# Patient Record
Sex: FEMALE | Race: WHITE | NOT HISPANIC OR LATINO | Employment: PART TIME | URBAN - METROPOLITAN AREA
[De-identification: names, ages, dates, MRNs, and addresses within clinical notes are randomized per-mention and may not be internally consistent; named-entity substitution may affect disease eponyms.]

---
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
## 2021-12-23 ENCOUNTER — OFFICE VISIT (OUTPATIENT)
Dept: URGENT CARE | Facility: PHYSICIAN GROUP | Age: 21
End: 2021-12-23
Payer: COMMERCIAL

## 2021-12-23 VITALS
HEART RATE: 72 BPM | SYSTOLIC BLOOD PRESSURE: 100 MMHG | BODY MASS INDEX: 22.82 KG/M2 | RESPIRATION RATE: 16 BRPM | WEIGHT: 142 LBS | HEIGHT: 66 IN | OXYGEN SATURATION: 98 % | DIASTOLIC BLOOD PRESSURE: 62 MMHG | TEMPERATURE: 98.5 F

## 2021-12-23 DIAGNOSIS — J02.9 PHARYNGITIS, UNSPECIFIED ETIOLOGY: ICD-10-CM

## 2021-12-23 LAB
INT CON NEG: NEGATIVE
INT CON POS: POSITIVE
S PYO AG THROAT QL: NEGATIVE

## 2021-12-23 PROCEDURE — 99213 OFFICE O/P EST LOW 20 MIN: CPT | Performed by: PHYSICIAN ASSISTANT

## 2021-12-23 PROCEDURE — 87880 STREP A ASSAY W/OPTIC: CPT | Performed by: PHYSICIAN ASSISTANT

## 2021-12-23 ASSESSMENT — ENCOUNTER SYMPTOMS
DIARRHEA: 0
PALPITATIONS: 0
COUGH: 0
SPUTUM PRODUCTION: 0
MYALGIAS: 0
WHEEZING: 0
NECK PAIN: 0
DIZZINESS: 0
DIAPHORESIS: 0
FEVER: 0
SHORTNESS OF BREATH: 0
NAUSEA: 0
HEADACHES: 0
VOMITING: 0
CHILLS: 0
ABDOMINAL PAIN: 0
SORE THROAT: 1
SINUS PAIN: 0

## 2021-12-23 NOTE — PROGRESS NOTES
"Subjective:   Bisi Mitchell is a 21 y.o. female who presents for Pharyngitis (Started yesterday )      HPI:  This is a very pleasant 21-year-old female presenting to the clinic with a sore throat that started yesterday.  Patient states that is minimally painful every time she swallows.  No difficulty swallowing or handling secretions.  She has not been running a fever.  Describes mild sinus congestion and clear rhinorrhea.  Denies any cough, shortness of breath or chest pain.  No body aches or chills.  Currently not taking any OTC medications for her symptoms.  No known ill contacts.    Review of Systems   Constitutional: Negative for chills, diaphoresis, fever and malaise/fatigue.   HENT: Positive for congestion and sore throat. Negative for ear pain and sinus pain.    Respiratory: Negative for cough, sputum production, shortness of breath and wheezing.    Cardiovascular: Negative for chest pain and palpitations.   Gastrointestinal: Negative for abdominal pain, diarrhea, nausea and vomiting.   Musculoskeletal: Negative for myalgias and neck pain.   Neurological: Negative for dizziness and headaches.   Endo/Heme/Allergies: Negative for environmental allergies.       Medications:    • Naida Tabs    Allergies: Patient has no known allergies.    Problem List: Bisi Mitchell does not have a problem list on file.    Surgical History:  No past surgical history on file.    Past Social Hx: Bisi Mitchell  reports that she has never smoked. She has never used smokeless tobacco. She reports that she does not drink alcohol and does not use drugs.     Past Family Hx:  Bisi Mitchell family history is not on file.     Problem list, medications, and allergies reviewed by myself today in Epic.     Objective:     /62   Pulse 72   Temp 36.9 °C (98.5 °F)   Resp 16   Ht 1.676 m (5' 6\")   Wt 64.4 kg (142 lb)   SpO2 98%   BMI 22.92 kg/m²     Physical Exam  Constitutional:       General: She is not in acute " distress.     Appearance: Normal appearance. She is not ill-appearing, toxic-appearing or diaphoretic.   HENT:      Head: Normocephalic and atraumatic.      Right Ear: Tympanic membrane, ear canal and external ear normal.      Left Ear: Tympanic membrane, ear canal and external ear normal.      Nose: Congestion and rhinorrhea present.      Mouth/Throat:      Mouth: Mucous membranes are moist.      Pharynx: No oropharyngeal exudate or posterior oropharyngeal erythema.   Eyes:      Conjunctiva/sclera: Conjunctivae normal.   Cardiovascular:      Rate and Rhythm: Normal rate and regular rhythm.      Pulses: Normal pulses.      Heart sounds: Normal heart sounds.   Pulmonary:      Effort: Pulmonary effort is normal.      Breath sounds: Normal breath sounds. No wheezing.   Musculoskeletal:      Cervical back: Normal range of motion. No rigidity. No muscular tenderness.   Lymphadenopathy:      Cervical: No cervical adenopathy.   Skin:     General: Skin is warm and dry.      Capillary Refill: Capillary refill takes less than 2 seconds.   Neurological:      Mental Status: She is alert.   Psychiatric:         Mood and Affect: Mood normal.         Thought Content: Thought content normal.       POCT Strep: Negative    Assessment/Plan:     Comments/MDM:     • Rapid strep in clinic was negative.  • Discussed likely viral etiology of the patient's symptoms.  • Increase fluid intake.  • Alternate Tylenol and ibuprofen as needed for pain.  • Salt water gargles and Cepacol spray if needed.  • Follow-up in clinic for any worsening symptoms.  Call with any questions or concerns.     Diagnosis and associated orders:     1. Pharyngitis, unspecified etiology  POCT Rapid Strep A            Differential diagnosis, natural history, supportive care, and indications for immediate follow-up discussed.    I personally reviewed prior external notes and test results pertinent to today's visit.     Advised the patient to follow-up with the primary  care physician for recheck, reevaluation, and consideration of further management.    Please note that this dictation was created using voice recognition software. I have made reasonable attempt to correct obvious errors, but I expect that there are errors of grammar and possibly content that I did not discover before finalizing the note.    This note was electronically signed by ATUL Barrera PA-C

## 2022-01-20 ENCOUNTER — OFFICE VISIT (OUTPATIENT)
Dept: URGENT CARE | Facility: PHYSICIAN GROUP | Age: 22
End: 2022-01-20
Payer: COMMERCIAL

## 2022-01-20 ENCOUNTER — HOSPITAL ENCOUNTER (OUTPATIENT)
Facility: MEDICAL CENTER | Age: 22
End: 2022-01-20
Attending: NURSE PRACTITIONER
Payer: COMMERCIAL

## 2022-01-20 VITALS
SYSTOLIC BLOOD PRESSURE: 100 MMHG | TEMPERATURE: 97.5 F | DIASTOLIC BLOOD PRESSURE: 60 MMHG | BODY MASS INDEX: 22.29 KG/M2 | HEIGHT: 67 IN | RESPIRATION RATE: 16 BRPM | WEIGHT: 142 LBS | OXYGEN SATURATION: 97 % | HEART RATE: 77 BPM

## 2022-01-20 DIAGNOSIS — R53.83 OTHER FATIGUE: ICD-10-CM

## 2022-01-20 DIAGNOSIS — J02.9 SORE THROAT: ICD-10-CM

## 2022-01-20 DIAGNOSIS — R05.9 COUGH: ICD-10-CM

## 2022-01-20 DIAGNOSIS — M79.10 MYALGIA: ICD-10-CM

## 2022-01-20 LAB
HETEROPH AB SER QL LA: NEGATIVE
INT CON NEG: NEGATIVE
INT CON NEG: NORMAL
INT CON POS: NORMAL
INT CON POS: POSITIVE
S PYO AG THROAT QL: NEGATIVE

## 2022-01-20 PROCEDURE — 86308 HETEROPHILE ANTIBODY SCREEN: CPT | Performed by: NURSE PRACTITIONER

## 2022-01-20 PROCEDURE — U0003 INFECTIOUS AGENT DETECTION BY NUCLEIC ACID (DNA OR RNA); SEVERE ACUTE RESPIRATORY SYNDROME CORONAVIRUS 2 (SARS-COV-2) (CORONAVIRUS DISEASE [COVID-19]), AMPLIFIED PROBE TECHNIQUE, MAKING USE OF HIGH THROUGHPUT TECHNOLOGIES AS DESCRIBED BY CMS-2020-01-R: HCPCS

## 2022-01-20 PROCEDURE — 87880 STREP A ASSAY W/OPTIC: CPT | Performed by: NURSE PRACTITIONER

## 2022-01-20 PROCEDURE — 99213 OFFICE O/P EST LOW 20 MIN: CPT | Performed by: NURSE PRACTITIONER

## 2022-01-20 PROCEDURE — U0005 INFEC AGEN DETEC AMPLI PROBE: HCPCS

## 2022-01-20 RX ORDER — NORETHINDRONE ACETATE AND ETHINYL ESTRADIOL, ETHINYL ESTRADIOL AND FERROUS FUMARATE 1MG-10(24)
KIT ORAL
COMMUNITY
Start: 2021-12-16

## 2022-01-20 ASSESSMENT — ENCOUNTER SYMPTOMS
MUSCULOSKELETAL NEGATIVE: 1
PSYCHIATRIC NEGATIVE: 1
COUGH: 1
EYES NEGATIVE: 1
SORE THROAT: 1
GASTROINTESTINAL NEGATIVE: 1
CARDIOVASCULAR NEGATIVE: 1
HEADACHES: 1

## 2022-01-20 NOTE — LETTER
January 20, 2022        Bisi Mitchell      Your employee/student was seen in our clinic today.  A concern for COVID-19 has been identified and testing is in progress.     We are asking you to excuse absences while following self-isolation protocol per Center for Disease Control (CDC) guidelines.  Your employee/student (and/or their parent) will be able to access test results through our electronic delivery system called [employer].     If the results of testing are negative, and once there has been no fever (temperature >100.4 F) for at least 72 hours without treatment, and no vomiting or diarrhea for at least 48 hours, then return to work/school is approved.    If the results of testing are positive then your employee/student will be contacted by the [facility] or [facility] for further instructions on duration of self-isolation and return to work protocol. In general, this will also follow the CDC guidelines     In general, repeat testing is not necessary and not offered through our [facility].     This is the only note that will be provided from Replaced by [facility] for this visit.  Your employee/student will require an appointment with a primary care provider if FMLA or disability forms are required.    If you have any questions please do not hesitate to call me at the phone number listed below.    Sincerely,      Harleen Avendaño, APRKATHARINE  [phone]  Electronically Signed

## 2022-01-20 NOTE — PROGRESS NOTES
"Subjective:   Bisi Mitchell is a 21 y.o. female who presents for Pharyngitis (Onset 2 days. )       HPI  Pt presents for evaluation of a new problem, reports 2-day history of progressively worsening sore throat, headache, dry cough and fatigue.  Patient has tried Mucinex and cough syrup with some relief.  Patient denies specific known ill contacts, however does work with the general public.  Patient is COVID vaccinated.    Review of Systems   Constitutional: Positive for malaise/fatigue.   HENT: Positive for sore throat.    Eyes: Negative.    Respiratory: Positive for cough.    Cardiovascular: Negative.    Gastrointestinal: Negative.    Genitourinary: Negative.    Musculoskeletal: Negative.    Skin: Negative.    Neurological: Positive for headaches.   Psychiatric/Behavioral: Negative.    All other systems reviewed and are negative.      MEDS:   Current Outpatient Medications:   •  LO LOESTRIN FE 1 MG-10 MCG / 10 MCG Tab, , Disp: , Rfl:   ALLERGIES: No Known Allergies    Patient's PMH, SocHx, SurgHx, FamHx, Drug allergies and medications were reviewed.     Objective:   /60 (BP Location: Left arm, Patient Position: Sitting, BP Cuff Size: Adult)   Pulse 77   Temp 36.4 °C (97.5 °F) (Temporal)   Resp 16   Ht 1.689 m (5' 6.5\")   Wt 64.4 kg (142 lb)   SpO2 97%   BMI 22.58 kg/m²     Physical Exam  Vitals and nursing note reviewed.   Constitutional:       General: She is awake.      Appearance: Normal appearance. She is well-developed and normal weight.   HENT:      Head: Normocephalic and atraumatic.      Right Ear: Tympanic membrane, ear canal and external ear normal.      Left Ear: Tympanic membrane, ear canal and external ear normal.      Nose: Nose normal.      Mouth/Throat:      Lips: Pink.      Mouth: Mucous membranes are moist.      Pharynx: Oropharynx is clear. Uvula midline. Posterior oropharyngeal erythema present.   Eyes:      Extraocular Movements: Extraocular movements intact.      " Conjunctiva/sclera: Conjunctivae normal.      Pupils: Pupils are equal, round, and reactive to light.   Neck:      Thyroid: No thyromegaly.      Trachea: Trachea normal.   Cardiovascular:      Rate and Rhythm: Normal rate and regular rhythm.      Pulses: Normal pulses.      Heart sounds: Normal heart sounds, S1 normal and S2 normal.   Pulmonary:      Effort: Pulmonary effort is normal. No respiratory distress.      Breath sounds: Normal breath sounds. No wheezing, rhonchi or rales.   Abdominal:      General: Bowel sounds are normal.      Palpations: Abdomen is soft.   Musculoskeletal:         General: Normal range of motion.      Cervical back: Full passive range of motion without pain, normal range of motion and neck supple.   Lymphadenopathy:      Cervical: No cervical adenopathy.   Skin:     General: Skin is warm and dry.      Capillary Refill: Capillary refill takes less than 2 seconds.   Neurological:      General: No focal deficit present.      Mental Status: She is alert and oriented to person, place, and time.      Gait: Gait is intact.   Psychiatric:         Attention and Perception: Attention and perception normal.         Mood and Affect: Mood normal.         Speech: Speech normal.         Behavior: Behavior normal. Behavior is cooperative.         Thought Content: Thought content normal.         Judgment: Judgment normal.         Assessment/Plan:   Assessment    1. Sore throat  - POCT Rapid Strep A-negative  - POCT Mononucleosis (mono)-negative  - SARS-CoV-2 PCR (24 hour In-House): Collect NP swab in VTM; Future    2. Other fatigue  - POCT Mononucleosis (mono)  - SARS-CoV-2 PCR (24 hour In-House): Collect NP swab in VTM; Future    3. Cough  - POCT Mononucleosis (mono)  - SARS-CoV-2 PCR (24 hour In-House): Collect NP swab in VTM; Future    4. Myalgia  - POCT Mononucleosis (mono)  - SARS-CoV-2 PCR (24 hour In-House): Collect NP swab in VTM; Future      Vital signs stable at today's acute urgent care visit.   Reviewed test results completed in clinic.  Will obtain PCR COVID testing.  Advised to home isolate per CDC guidelines.  Supportive care options also discussed, to include alternating Tylenol and Advil, cough/cold/flu OTC medications for symptomatic relief, in addition to rest, fluids as tolerated. Differential diagnosis, natural history, and indications for immediate follow-up were discussed.     Advised the patient to follow-up with the primary care provider for recheck, reevaluation, and/or consideration of further management if necessary. Return to urgent care with any worsening symptoms or if there is no improvement in their current condition. Red flags discussed and indications to immediately call 911 or present to the Emergency Department.  All questions were encouraged and answered to the patient's satisfaction and understanding, and they agree to the plan of care.     I personally reviewed prior external notes and test results pertinent to today's visit.  I have independently reviewed and interpreted all diagnostics ordered during this urgent care acute visit. Time spent evaluating this patient was a greater than 30 minutes and includes preparing for visit, counseling/education, exam and evaluation, obtaining history, independent interpretation and ordering lab/test/procedures.      Please note that this dictation was created using voice recognition software. I have made a reasonable attempt to correct obvious errors, but I expect that there are errors of grammar and possibly content that I did not discover before finalizing the note.

## 2022-01-21 DIAGNOSIS — R53.83 OTHER FATIGUE: ICD-10-CM

## 2022-01-21 DIAGNOSIS — J02.9 SORE THROAT: ICD-10-CM

## 2022-01-21 DIAGNOSIS — M79.10 MYALGIA: ICD-10-CM

## 2022-01-21 DIAGNOSIS — R05.9 COUGH: ICD-10-CM

## 2022-01-21 LAB
COVID ORDER STATUS COVID19: NORMAL
SARS-COV-2 RNA RESP QL NAA+PROBE: DETECTED
SPECIMEN SOURCE: ABNORMAL

## 2023-03-02 ENCOUNTER — OFFICE VISIT (OUTPATIENT)
Dept: URGENT CARE | Facility: CLINIC | Age: 23
End: 2023-03-02
Payer: COMMERCIAL

## 2023-03-02 VITALS
DIASTOLIC BLOOD PRESSURE: 82 MMHG | BODY MASS INDEX: 25.11 KG/M2 | RESPIRATION RATE: 18 BRPM | OXYGEN SATURATION: 97 % | HEIGHT: 67 IN | HEART RATE: 76 BPM | TEMPERATURE: 98.7 F | SYSTOLIC BLOOD PRESSURE: 116 MMHG | WEIGHT: 160 LBS

## 2023-03-02 DIAGNOSIS — Z20.822 EXPOSURE TO COVID-19 VIRUS: ICD-10-CM

## 2023-03-02 DIAGNOSIS — R06.02 SOB (SHORTNESS OF BREATH) ON EXERTION: ICD-10-CM

## 2023-03-02 DIAGNOSIS — J06.9 VIRAL URI WITH COUGH: ICD-10-CM

## 2023-03-02 LAB
FLUAV RNA SPEC QL NAA+PROBE: NEGATIVE
FLUBV RNA SPEC QL NAA+PROBE: NEGATIVE
RSV RNA SPEC QL NAA+PROBE: NEGATIVE
SARS-COV-2 RNA RESP QL NAA+PROBE: NEGATIVE

## 2023-03-02 PROCEDURE — 99213 OFFICE O/P EST LOW 20 MIN: CPT | Mod: CS | Performed by: STUDENT IN AN ORGANIZED HEALTH CARE EDUCATION/TRAINING PROGRAM

## 2023-03-02 PROCEDURE — 87651 STREP A DNA AMP PROBE: CPT | Performed by: STUDENT IN AN ORGANIZED HEALTH CARE EDUCATION/TRAINING PROGRAM

## 2023-03-02 PROCEDURE — 0241U POCT CEPHEID COV-2, FLU A/B, RSV - PCR: CPT | Performed by: STUDENT IN AN ORGANIZED HEALTH CARE EDUCATION/TRAINING PROGRAM

## 2023-03-02 RX ORDER — ALBUTEROL SULFATE 90 UG/1
2 AEROSOL, METERED RESPIRATORY (INHALATION) EVERY 4 HOURS PRN
Qty: 8.5 G | Refills: 0 | Status: SHIPPED | OUTPATIENT
Start: 2023-03-02 | End: 2023-03-15

## 2023-03-02 RX ORDER — DROSPIRENONE AND ETHINYL ESTRADIOL TABLETS 0.02-3(28)
1 KIT ORAL DAILY
COMMUNITY
Start: 2023-01-05 | End: 2023-03-02

## 2023-03-02 ASSESSMENT — ENCOUNTER SYMPTOMS
RHINORRHEA: 1
COUGH: 1
PALPITATIONS: 0
NAUSEA: 0
FEVER: 0
VOMITING: 0
CONSTIPATION: 0
CHILLS: 0
WHEEZING: 0
DIARRHEA: 0
MYALGIAS: 1
SHORTNESS OF BREATH: 1
SORE THROAT: 0
HEADACHES: 1
ABDOMINAL PAIN: 0

## 2023-03-02 NOTE — LETTER
March 2, 2023    To Whom It May Concern:    This is confirmation that Bisi Mitchell attended her scheduled appointment with Lupe Schafer P.A.-C. on 3/02/23. A concern for COVID-19 has been identified and testing is in progress. We are asking you to excuse absences while following self-isolation protocol per Center for Disease Control (CDC) guidelines.  Your employee will be able to access test results through our electronic delivery system called [employer].    If the results of testing are negative, and once there has been no fever (temperature >100.4 F) for at least 24 hours without treatment, then return to work is approved. If the results of testing are positive then please excuse absenses per CDC guidelines. In general, repeat testing is not necessary and not offered through our [facility].    If you have any questions please do not hesitate to call me at the phone number listed below.       Sincerely,    Lupe Schafer P.A.-C.  [phone]

## 2023-03-03 NOTE — PROGRESS NOTES
"Subjective     Bisi Mitchell is a 22 y.o. female who presents with Headache (Started today, runny nose, congestion, exposure to Covid-19 by roommate )            URI   This is a new problem. The current episode started today. The problem has been unchanged. Associated symptoms include congestion, coughing, headaches and rhinorrhea. Pertinent negatives include no abdominal pain, chest pain, diarrhea, ear pain, nausea, rash, sore throat, vomiting or wheezing. She has tried nothing for the symptoms.     Review of Systems   Constitutional:  Positive for malaise/fatigue. Negative for chills and fever.   HENT:  Positive for congestion and rhinorrhea. Negative for ear pain and sore throat.    Respiratory:  Positive for cough and shortness of breath. Negative for wheezing.    Cardiovascular:  Negative for chest pain and palpitations.   Gastrointestinal:  Negative for abdominal pain, constipation, diarrhea, nausea and vomiting.   Musculoskeletal:  Positive for myalgias.   Skin:  Negative for rash.   Neurological:  Positive for headaches.   All other systems reviewed and are negative.           Objective     /82   Pulse 76   Temp 37.1 °C (98.7 °F) (Temporal)   Resp 18   Ht 1.702 m (5' 7\")   Wt 72.6 kg (160 lb)   SpO2 97%   BMI 25.06 kg/m²      Physical Exam  Vitals reviewed.   Constitutional:       General: She is not in acute distress.     Appearance: Normal appearance. She is ill-appearing. She is not toxic-appearing or diaphoretic.   HENT:      Head: Normocephalic and atraumatic.      Nose: Nose normal.      Mouth/Throat:      Mouth: Mucous membranes are moist.      Pharynx: Oropharynx is clear.   Eyes:      Extraocular Movements: Extraocular movements intact.      Conjunctiva/sclera: Conjunctivae normal.      Pupils: Pupils are equal, round, and reactive to light.   Cardiovascular:      Rate and Rhythm: Normal rate and regular rhythm.   Pulmonary:      Effort: Pulmonary effort is normal. No respiratory " distress.      Breath sounds: Normal breath sounds. No stridor. No wheezing, rhonchi or rales.   Skin:     General: Skin is warm and dry.   Neurological:      General: No focal deficit present.      Mental Status: She is alert. Mental status is at baseline.                           Assessment & Plan        1. Viral URI with cough    2. Exposure to COVID-19 virus  - POCT CEPHEID COV-2, FLU A/B, RSV: NEGATIVE  - Recommend re-testing at home as today is only day #1 of symptoms.    3. SOB (shortness of breath) on exertion  - albuterol (PROAIR HFA) 108 (90 Base) MCG/ACT Aero Soln inhalation aerosol; Inhale 2 Puffs every four hours as needed for Shortness of Breath.  Dispense: 8.5 g; Refill: 0     Differential diagnoses, supportive care measures and indications for immediate follow-up discussed with patient. Pathogenesis of diagnosis discussed including typical length and natural progression.      Instructed to return to urgent care or nearest emergency department if symptoms fail to improve, for any change in condition, further concerns, or new concerning symptoms.    Patient states understanding and agrees with the plan of care and discharge instructions.

## 2023-03-15 ENCOUNTER — HOSPITAL ENCOUNTER (OUTPATIENT)
Facility: MEDICAL CENTER | Age: 23
End: 2023-03-15
Payer: COMMERCIAL

## 2023-03-15 ENCOUNTER — OFFICE VISIT (OUTPATIENT)
Dept: URGENT CARE | Facility: CLINIC | Age: 23
End: 2023-03-15
Payer: COMMERCIAL

## 2023-03-15 VITALS
TEMPERATURE: 98.3 F | HEART RATE: 78 BPM | BODY MASS INDEX: 24.91 KG/M2 | HEIGHT: 67 IN | WEIGHT: 158.73 LBS | SYSTOLIC BLOOD PRESSURE: 122 MMHG | OXYGEN SATURATION: 95 % | DIASTOLIC BLOOD PRESSURE: 80 MMHG | RESPIRATION RATE: 16 BRPM

## 2023-03-15 DIAGNOSIS — J39.2 LESION OF THROAT: ICD-10-CM

## 2023-03-15 DIAGNOSIS — J02.9 ACUTE PHARYNGITIS, UNSPECIFIED ETIOLOGY: ICD-10-CM

## 2023-03-15 DIAGNOSIS — J02.9 SORE THROAT: ICD-10-CM

## 2023-03-15 LAB — S PYO DNA SPEC NAA+PROBE: NOT DETECTED

## 2023-03-15 PROCEDURE — 87070 CULTURE OTHR SPECIMN AEROBIC: CPT

## 2023-03-15 PROCEDURE — 99214 OFFICE O/P EST MOD 30 MIN: CPT

## 2023-03-15 PROCEDURE — 87651 STREP A DNA AMP PROBE: CPT

## 2023-03-15 RX ORDER — METHYLPREDNISOLONE 4 MG/1
TABLET ORAL
Qty: 21 TABLET | Refills: 0 | Status: SHIPPED | OUTPATIENT
Start: 2023-03-15 | End: 2023-03-15

## 2023-03-15 RX ORDER — DIPHENHYDRAMINE HYDROCHLORIDE AND LIDOCAINE HYDROCHLORIDE AND ALUMINUM HYDROXIDE AND MAGNESIUM HYDRO
5 KIT EVERY 6 HOURS PRN
Qty: 119 ML | Refills: 0 | Status: SHIPPED | OUTPATIENT
Start: 2023-03-15 | End: 2023-03-18

## 2023-03-15 RX ORDER — AMOXICILLIN 500 MG/1
500 CAPSULE ORAL 2 TIMES DAILY
Qty: 20 CAPSULE | Refills: 0 | Status: SHIPPED | OUTPATIENT
Start: 2023-03-15 | End: 2023-03-25

## 2023-03-15 RX ORDER — DEXAMETHASONE 6 MG/1
6 TABLET ORAL DAILY
Qty: 2 TABLET | Refills: 0 | Status: SHIPPED | OUTPATIENT
Start: 2023-03-15 | End: 2023-03-17

## 2023-03-15 ASSESSMENT — ENCOUNTER SYMPTOMS
SWOLLEN GLANDS: 0
SHORTNESS OF BREATH: 0
FEVER: 0
CHILLS: 0
FATIGUE: 0
SORE THROAT: 0
STRIDOR: 0
CARDIOVASCULAR NEGATIVE: 1
NECK PAIN: 0
NAUSEA: 0
COUGH: 0
ABDOMINAL PAIN: 0
SINUS PAIN: 0
WEAKNESS: 0
VOMITING: 0

## 2023-03-15 NOTE — PROGRESS NOTES
"Subjective     Bisi Mitchell is a 22 y.o. female who presents with Sore Throat (Pt reports she has developed a \"cancer sore\" on the back of her throat. Pt states this is a recurring issue, however this time it is causing significant pain. )            Other  This is a recurrent problem. The current episode started in the past 7 days. The problem occurs intermittently. The problem has been gradually worsening. Pertinent negatives include no abdominal pain, chills, congestion, coughing, fatigue, fever, nausea, neck pain, sore throat, swollen glands, vomiting or weakness. Associated symptoms comments: Left sided tonsillar lesion . Nothing aggravates the symptoms. She has tried nothing for the symptoms.     Review of Systems   Constitutional:  Negative for chills, fatigue and fever.   HENT:  Negative for congestion, ear discharge, ear pain, sinus pain, sore throat and tinnitus.    Respiratory:  Negative for cough, shortness of breath and stridor.    Cardiovascular: Negative.    Gastrointestinal:  Negative for abdominal pain, nausea and vomiting.   Musculoskeletal:  Negative for neck pain.   Skin: Negative.    Neurological:  Negative for weakness.            Objective     /80   Pulse 78   Temp 36.8 °C (98.3 °F) (Temporal)   Resp 16   Ht 1.702 m (5' 7\")   Wt 72 kg (158 lb 11.7 oz)   SpO2 95%   BMI 24.86 kg/m²      Physical Exam  Constitutional:       Appearance: Normal appearance.   HENT:      Right Ear: Tympanic membrane normal.      Left Ear: Tympanic membrane normal.      Mouth/Throat:      Lips: No lesions.      Mouth: Mucous membranes are moist. No oral lesions.      Dentition: No gingival swelling or gum lesions.      Tongue: No lesions. Tongue does not deviate from midline.      Palate: No mass and lesions.      Pharynx: Posterior oropharyngeal erythema present. No pharyngeal swelling, oropharyngeal exudate or uvula swelling.      Tonsils: 1+ on the right. 1+ on the left.      Comments: Left sided " tonsillar lesion, uvula is midline.  No impaired movement of the jaw, no swelling into the palate.  Mild erythremia noted around the site.    Cardiovascular:      Rate and Rhythm: Normal rate and regular rhythm.      Pulses: Normal pulses.      Heart sounds: Normal heart sounds.   Pulmonary:      Effort: Pulmonary effort is normal. No respiratory distress.      Breath sounds: Normal breath sounds. No stridor.   Abdominal:      General: Abdomen is flat.      Palpations: Abdomen is soft.   Musculoskeletal:         General: Normal range of motion.      Cervical back: Normal range of motion. No rigidity or tenderness.   Lymphadenopathy:      Cervical: No cervical adenopathy.   Skin:     General: Skin is warm and dry.   Neurological:      General: No focal deficit present.      Mental Status: She is alert.   Psychiatric:         Mood and Affect: Mood normal.         Behavior: Behavior normal.                           Assessment & Plan   Patient comes in today with complaints of what she believes to be a canker sore on her left back tonsil.  No noted fevers.  This started approximately 1 week ago.  She has not taken anything for this.  This is a recurrent issue that she has experienced in the past and been given Chloraseptic spray for.  She has not followed up with ENT.  She does have a noted history of strep, over the last year.  She does smoke.  On assessment there is no swelling noted to the palate tongue or jaw.  She does have a noted lesion to the left tonsil, smaller than a dime, no major redness, she is able to open and close her mouth with ease, no swelling of the tongue, no swelling of the gums or the surrounding areas, uvula is midline, the right tonsil is visualized and within normal limits.  Extensive conversation was had with the patient on the need for possible antibiotics and further evaluation with ENT, PCP.  I did explain the need for possible evaluation if it continues to get worse, development of  fever, swelling into the jaw or cheek, palate.  She does not have any fevers, her vitals are within normal limits.  PCO T strep culture was complete, patient was given a prescription for Chloraseptic spray at her request.  An additional dose of steroids was sent to aid with pain and swelling.  She was advised to not smoke.      PCOT Strep culture was negative, sent for further culture, patient notified via MyChart and told to go to the ER if symptoms worsen, consideration for potential HSV (Differential diagnosis)              1. Sore throat  sore throat spray (CHLORASEPTIC) 1.4 % Liquid    POCT GROUP A STREP, PCR    dexamethasone (DECADRON) 6 MG Tab    CULTURE THROAT    DISCONTINUED: methylPREDNISolone (MEDROL DOSEPAK) 4 MG Tablet Therapy Pack      2. Lesion of throat  Referral back to [facility] PCP    DENA-Lido-AlJacqui-MgHydr-Simeth (MAGIC MOUTHWASH BLM) Suspension      3. Acute pharyngitis, unspecified etiology  amoxicillin (AMOXIL) 500 MG Cap

## 2023-03-16 DIAGNOSIS — J02.9 SORE THROAT: ICD-10-CM

## 2023-03-17 ENCOUNTER — TELEPHONE (OUTPATIENT)
Dept: HEALTH INFORMATION MANAGEMENT | Facility: OTHER | Age: 23
End: 2023-03-17

## 2023-03-18 LAB
BACTERIA SPEC RESP CULT: NORMAL
SIGNIFICANT IND 70042: NORMAL
SITE SITE: NORMAL
SOURCE SOURCE: NORMAL